# Patient Record
Sex: FEMALE | Race: WHITE
[De-identification: names, ages, dates, MRNs, and addresses within clinical notes are randomized per-mention and may not be internally consistent; named-entity substitution may affect disease eponyms.]

---

## 2017-07-24 ENCOUNTER — HOSPITAL ENCOUNTER (EMERGENCY)
Dept: HOSPITAL 63 - ER | Age: 32
Discharge: HOME | End: 2017-07-24
Payer: OTHER GOVERNMENT

## 2017-07-24 VITALS
DIASTOLIC BLOOD PRESSURE: 67 MMHG | SYSTOLIC BLOOD PRESSURE: 117 MMHG | SYSTOLIC BLOOD PRESSURE: 117 MMHG | DIASTOLIC BLOOD PRESSURE: 67 MMHG | SYSTOLIC BLOOD PRESSURE: 117 MMHG | DIASTOLIC BLOOD PRESSURE: 67 MMHG

## 2017-07-24 VITALS — WEIGHT: 135 LBS | HEIGHT: 65 IN | BODY MASS INDEX: 22.49 KG/M2
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
## 2017-07-24 DIAGNOSIS — Z88.1: ICD-10-CM
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
## 2017-07-24 DIAGNOSIS — S39.012A: Primary | ICD-10-CM
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
## 2017-07-24 PROCEDURE — 96372 THER/PROPH/DIAG INJ SC/IM: CPT

## 2017-07-24 PROCEDURE — 99283 EMERGENCY DEPT VISIT LOW MDM: CPT

## 2017-07-24 NOTE — PHYS DOC
General


Chief Complaint:  BACK PAIN OR INJURY


Stated Complaint:  BACK PAIN


Time Seen by MD:  15:56


Source:  patient


Exam Limitations:  no limitations


Problems:  





History of Present Illness


Initial Comments


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 

department complaining of pain and stiffness.


Patient states that she recently moved locally, she says she and her spouse 

moved themselves. She's been doing a lot of lifting and physical labor. She had 

a PT test for the  this past Thursday. Temperatures have been in the 

high 90s with heat indexes and humidity very high. The patient is very fit and 

says she's been trying to hydrate. Over the course of the past week or so she's 

had increasing low back stiffness and generalized pain. Today pain and 

stiffness so great that it hard for her to stand up straight. She denies any 

leg symptoms or actual back injury, no saddle anesthesia or associated bowel/

bladder symptoms. Today while sitting at the computer she experienced muscle 

spasms in her leg and neck and has been drinking more water. Symptoms described 

as moderate pain is achy and stiff worse with movement better with rest. No 

prior history of back injury or chronic discomforts.


Timing/Duration:  week, getting worse


Severity/Quality:  moderate, dullness


Location:  lumbar spine, paraspinous muscles


Method of Injury:  other


Modifying Factors:  worse with jarring, worse with movement, improves with rest


Associated Symptoms:  muscle spasms, lower back pain


Allergies:  


Coded Allergies:  


     amoxicillin (Verified  Allergy, Unknown, Rash, 7/24/17)





Past Medical History


Medical History:  no pertinent history


Surgical History:  no surgical history





Social History


Smoker:  non-smoker


Alcohol:  occasionally


Drugs:  none





Review of Systems


Constitutional:  denies chills, denies diaphoresis, denies fever


Respiratory:  denies cough, denies shortness of breath


Cardiovascular:  denies chest pain, denies palpitations


Gastrointestinal:  denies abdominal pain, denies diarrhea, denies nausea, 

denies vomiting


Genitourinary:  denies dysuria, denies frequency, denies hematuria


Musculoskeletal:  see HPI


Psychiatric/Neurological:  denies headache, denies numbness, denies paresthesia

, denies weakness





Physical Exam


General Appearance:  WD/WN, no apparent distress


HEENT:  normal ENT inspection


Neck:  non-tender, full range of motion


Cardiovascular/Respiratory:  normal peripheral pulses, no respiratory distress


Gastrointestinal:  non tender, soft


Back:  no CVA tenderness, no vertebral tenderness, decreased range of motion, 

muscle spasm (lumbar paraspinal hypertonicity with mild tenderness noted there 

is no midline or bony pain no palpable malalignment or bony deformity.)


Extremities:  no evidence of injury, normal range of motion, non-tender, pelvis 

stable


Neurologic/Psychiatric:  CNs II-XII nml as tested, no motor/sensory deficits, 

alert, normal mood/affect, oriented x 3, other (DTRs/strength/sensory equal and 

intact bilateral lower extremities, negative straight leg raise bilaterally)


Skin:  normal color, warm/dry





Orders, Labs, Meds


I discussed the treatment plan including rest, hydration, oral medications. I 

discussed imaging and the lack of indication at this point no trauma history or 

neurologic radiculopathy symptoms. I discussed follow-up and her questions were 

answered she expressed agreement and understanding with the treatment plan.


Departure


Time of Disposition:  16:16


Disposition:  01 HOME, SELF-CARE


Diagnosis:  lumbar strain


Condition:  GOOD


Patient Instructions:  Low Back Strain with [facility]





Additional Instructions:  


Rest, no strenuous activity.


Keep activity to "pain free."


Aggressive hydration with Gatorade.


Eat 1 banana twice daily


Warm compresses/heating pad to affected area 15-20 minutes 4-6 times daily 

followed by gentle stretching.


Prescription: Prednisone, cyclobenzaprine, Norco 5 mg quantity 10


Take medications with food to avoid nausea.


Follow-up at [facility] in 3-5 days for recheck.


Return to the ED with new or changing symptoms. CHRISTIANO Espana DO Jul 24, 2017 16:20

## 2021-12-14 ENCOUNTER — HOSPITAL ENCOUNTER (OUTPATIENT)
Dept: HOSPITAL 63 - US | Age: 36
End: 2021-12-14
Payer: OTHER GOVERNMENT

## 2021-12-14 DIAGNOSIS — R59.0: Primary | ICD-10-CM

## 2021-12-14 PROCEDURE — 76536 US EXAM OF HEAD AND NECK: CPT

## 2021-12-15 NOTE — RAD
US HEAD/NECK SOFT TISSUE



History:Reason: ENLARGED LYMPH NODES / Spl. Instructions:  / History: 



Comparison: None



Technique: Sonographic examination of the anterior neck soft tissues



Findings:

Heterogeneous lobulated lesion within the anterior neck soft tissues within the submental region italia
ures 0.5 x 0.5 x 0.3 cm.



Impression:

1.  Small lesion within the submental region, most likely lymph node. Recommend continued clinical fo
llow-up and imaging follow-up if interval growth.



Electronically signed by: Frederic Vera DO (12/15/2021 9:30 AM) [id number]

## 2022-03-09 ENCOUNTER — HOSPITAL ENCOUNTER (OUTPATIENT)
Dept: HOSPITAL 63 - US | Age: 37
End: 2022-03-09
Attending: CLINICAL NURSE SPECIALIST
Payer: OTHER GOVERNMENT

## 2022-03-09 DIAGNOSIS — N85.4: Primary | ICD-10-CM

## 2022-03-09 DIAGNOSIS — N94.89: ICD-10-CM

## 2022-03-09 PROCEDURE — 76830 TRANSVAGINAL US NON-OB: CPT

## 2022-03-09 PROCEDURE — 76700 US EXAM ABDOM COMPLETE: CPT

## 2022-03-09 NOTE — RAD
Complete abdominal ultrasound 3/9/2022 11:05 AM



Clinical History: Abdominal pain: 



Technique:  Ultrasound examination of the abdomen was performed, and multiple static images were subm
itted for review.



Comparison:  None 



Findings:



The visualized portions of the pancreas are within normal limits. The visualized aorta and IVC are un
remarkable. 



The gallbladder is normal in appearance without wall thickening, stones, or sludge. No pericholecysti
c fluid is seen. Sonographic Pabon's sign is negative.  The common bile duct measures 2 mm in diamet
er which is within normal limits.



The liver measures 16 cm longitudinally. The liver is normal in echotexture. No intrahepatic biliary 
ductal dilatation is seen. No focal hepatic lesions are identified. 



The spleen is normal in appearance measuring 8 cm longitudinally.



The bilateral kidneys are normal in appearance without evidence of obstructive uropathy, nephrolithia
sis, or focal renal lesion. Right kidney measures 9.9 in length. Left kidney measures 9.9 cm in lengt
h. 



Impression: Normal sonographic appearance of the abdomen



Electronically signed by: John Davis MD (3/9/2022 12:20 PM) [id number]

## 2022-03-09 NOTE — RAD
US TRANSVAGINAL



Clinical Indication: Reason: PELVIC PAIN / Spl. Instructions:  / History: 



Comparison: None.



TECHNIQUE: Real-time ultrasound imaging of the pelvis using transvaginal window is performed.



Findings: 

Anteverted uterus measures 7.7 x 3.9 x 3.4 cm. IUD is in appropriate position. There is no thickening
 of the endometrium. There is a small nabothian cyst. There are bilateral prominent parauterine veins
.



There is a 1.1 cm follicle of the right ovary. There are small follicles of the left ovary. There is 
normal blood flow in the ovaries.



There is no evidence of adnexal mass. No pelvic free fluid is identified.



IMPRESSION:

1.  There are prominent bilateral parauterine veins. Correlate for pelvic congestion.

2.  Small nabothian cyst.

3.  IUD in appropriate position.



Electronically signed by: Wang Mcguire MD (3/9/2022 1:33 PM) [id number]